# Patient Record
(demographics unavailable — no encounter records)

---

## 2025-06-09 NOTE — CONSULT LETTER
[Dear  ___] : Dear  [unfilled], [Consult Letter:] : I had the pleasure of evaluating your patient, [unfilled]. [Please see my note below.] : Please see my note below. [Consult Closing:] : Thank you very much for allowing me to participate in the care of this patient.  If you have any questions, please do not hesitate to contact me. [Sincerely,] : Sincerely, [FreeTextEntry3] : Radha Fried MD Division of Pediatric Gastroenterology [facility]

## 2025-06-09 NOTE — ASSESSMENT
[FreeTextEntry1] : 14 month old male with history of milk protein intolerance and irritability noted to have discomfort when milk was introduced into his diet at 1 year of age which resolved when placed back on Elecare. Unclear if irritability was related to multiple illnesses including flu, coxsackie and viral illnesses  Unlikely to be continuation of milk protein intolerance given able to eat ice cream and other dairy containing food without difficulty. Reviewed difference between lactose malabsorption, dairy allergy and sensitivities. Of note strong family history of EoE (father) and IBD (mother) Eats predominantly a mediteranean diet Reviewed AAP recommendations regarding milk ingestion f/u as clinically indicated

## 2025-06-09 NOTE — PHYSICAL EXAM
[Well Developed] : well developed [NAD] : in no acute distress [PERRL] : pupils were equal, round, reactive to light  [icteric] : anicteric [Moist & Pink Mucous Membranes] : moist and pink mucous membranes [Respiratory Distress] : no respiratory distress  [CTAB] : lungs clear to auscultation bilaterally [Regular Rate and Rhythm] : regular rate and rhythm [Normal S1, S2] : normal S1 and S2 [Soft] : soft  [Distended] : non distended [Tender] : non tender [Normal Bowel Sounds] : normal bowel sounds [No HSM] : no hepatosplenomegaly appreciated [Normal rectal exam] : exam was normal [Normal External Genitalia] : normal external genitalia [Normal Tone] : normal tone [Well-Perfused] : well-perfused [Cyanosis] : no cyanosis [Edema] : no edema [Rash] : no rash [Jaundice] : no jaundice [Interactive] : interactive [FreeTextEntry1] : walking

## 2025-06-09 NOTE — HISTORY OF PRESENT ILLNESS
[de-identified] : 14 month old male last seen at 1 month of age for irritability, increased intestinal gas and gastroesophageal reflux with presumptive milk protein allergy. Was on Elecare and milk and dairy free diet until 1 year of age. Started milk at 1 year of age and was uncomfortable. No blood noted in stool. Eats dairy but stopped milk and went back on Elecare. Drinks 6 oz in AM, 2 4 oz bottles during day and 6 oz at night.  Sleeps through night. No vomiting. BMs daily without difficulty.  During this period of time also was sick with flu, Coxsackie and other viral illnesses with fever Birth Hx: Born at 7 lb 6 oz 39 weeks via Csxn due to maternal UC on Skyrizi and Flagyl Passed meconium Mother with UC s/p colectomy, asthma, female pattern hair loss (not alopecia) Father with eosinophilic esophagitis, exercise induced asthma Lives with parents, older brother with milk protein allergy now with stool withholding behavior.   Mother started new job as an educator with elementary school curriculum development for science